# Patient Record
Sex: MALE | Race: WHITE | NOT HISPANIC OR LATINO | URBAN - METROPOLITAN AREA
[De-identification: names, ages, dates, MRNs, and addresses within clinical notes are randomized per-mention and may not be internally consistent; named-entity substitution may affect disease eponyms.]

---

## 2019-04-03 ENCOUNTER — APPOINTMENT (OUTPATIENT)
Dept: GENERAL RADIOLOGY | Facility: HOSPITAL | Age: 54
End: 2019-04-03

## 2019-04-03 ENCOUNTER — HOSPITAL ENCOUNTER (EMERGENCY)
Facility: HOSPITAL | Age: 54
Discharge: HOME | End: 2019-04-03
Attending: EMERGENCY MEDICINE | Admitting: EMERGENCY MEDICINE

## 2019-04-03 VITALS
DIASTOLIC BLOOD PRESSURE: 87 MMHG | TEMPERATURE: 98.1 F | OXYGEN SATURATION: 97 % | RESPIRATION RATE: 24 BRPM | SYSTOLIC BLOOD PRESSURE: 150 MMHG | BODY MASS INDEX: 26.68 KG/M2 | WEIGHT: 170 LBS | HEIGHT: 67 IN | HEART RATE: 96 BPM

## 2019-04-03 DIAGNOSIS — E87.20 COMPENSATED METABOLIC ACIDOSIS: Primary | ICD-10-CM

## 2019-04-03 DIAGNOSIS — N28.9 RENAL INSUFFICIENCY: ICD-10-CM

## 2019-04-03 LAB
ALBUMIN SERPL-MCNC: 4.55 G/DL (ref 3.2–4.8)
ALBUMIN/GLOB SERPL: 2.1 G/DL (ref 1.5–2.5)
ALP SERPL-CCNC: 60 U/L (ref 25–100)
ALT SERPL W P-5'-P-CCNC: 156 U/L (ref 7–40)
ANION GAP SERPL CALCULATED.3IONS-SCNC: 13 MMOL/L (ref 3–11)
ARTERIAL PATENCY WRIST A: ABNORMAL
AST SERPL-CCNC: 80 U/L (ref 0–33)
ATMOSPHERIC PRESS: ABNORMAL MMHG
BACTERIA UR QL AUTO: NORMAL /HPF
BASE EXCESS BLDA CALC-SCNC: -6.6 MMOL/L (ref 0–2)
BASOPHILS # BLD AUTO: 0.04 10*3/MM3 (ref 0–0.2)
BASOPHILS NFR BLD AUTO: 0.5 % (ref 0–1)
BDY SITE: ABNORMAL
BILIRUB SERPL-MCNC: 0.5 MG/DL (ref 0.3–1.2)
BILIRUB UR QL STRIP: NEGATIVE
BODY TEMPERATURE: 37 C
BUN BLD-MCNC: 18 MG/DL (ref 9–23)
BUN/CREAT SERPL: 9.9 (ref 7–25)
CALCIUM SPEC-SCNC: 9.4 MG/DL (ref 8.7–10.4)
CHLORIDE SERPL-SCNC: 111 MMOL/L (ref 99–109)
CLARITY UR: CLEAR
CO2 BLDA-SCNC: 17.5 MMOL/L (ref 23–27)
CO2 SERPL-SCNC: 14 MMOL/L (ref 20–31)
COHGB MFR BLD: 1.7 % (ref 0–2)
COLOR UR: YELLOW
CREAT BLD-MCNC: 1.82 MG/DL (ref 0.6–1.3)
DEPRECATED RDW RBC AUTO: 46 FL (ref 37–54)
EOSINOPHIL # BLD AUTO: 0.09 10*3/MM3 (ref 0–0.3)
EOSINOPHIL NFR BLD AUTO: 1.1 % (ref 0–3)
ERYTHROCYTE [DISTWIDTH] IN BLOOD BY AUTOMATED COUNT: 13.1 % (ref 11.3–14.5)
GFR SERPL CREATININE-BSD FRML MDRD: 39 ML/MIN/1.73
GLOBULIN UR ELPH-MCNC: 2.2 GM/DL
GLUCOSE BLD-MCNC: 96 MG/DL (ref 70–100)
GLUCOSE BLDC GLUCOMTR-MCNC: 127 MG/DL (ref 70–130)
GLUCOSE UR STRIP-MCNC: NEGATIVE MG/DL
HCO3 BLDA-SCNC: 16.7 MMOL/L (ref 20–26)
HCT VFR BLD AUTO: 39.1 % (ref 38.9–50.9)
HCT VFR BLD CALC: 40 %
HGB BLD-MCNC: 13.3 G/DL (ref 13.1–17.5)
HGB BLDA-MCNC: 13 G/DL (ref 13.5–17.5)
HGB UR QL STRIP.AUTO: NEGATIVE
HOLD SPECIMEN: NORMAL
HOLD SPECIMEN: NORMAL
HOROWITZ INDEX BLD+IHG-RTO: 21 %
HYALINE CASTS UR QL AUTO: NORMAL /LPF
IMM GRANULOCYTES # BLD AUTO: 0.03 10*3/MM3 (ref 0–0.05)
IMM GRANULOCYTES NFR BLD AUTO: 0.4 % (ref 0–0.6)
KETONES UR QL STRIP: NEGATIVE
LEUKOCYTE ESTERASE UR QL STRIP.AUTO: ABNORMAL
LYMPHOCYTES # BLD AUTO: 3.64 10*3/MM3 (ref 0.6–4.8)
LYMPHOCYTES NFR BLD AUTO: 45.2 % (ref 24–44)
MAGNESIUM SERPL-MCNC: 1.8 MG/DL (ref 1.3–2.7)
MCH RBC QN AUTO: 32.8 PG (ref 27–31)
MCHC RBC AUTO-ENTMCNC: 34 G/DL (ref 32–36)
MCV RBC AUTO: 96.3 FL (ref 80–99)
METHGB BLD QL: 1.3 % (ref 0–1.5)
MODALITY: ABNORMAL
MONOCYTES # BLD AUTO: 1.37 10*3/MM3 (ref 0–1)
MONOCYTES NFR BLD AUTO: 17 % (ref 0–12)
NEUTROPHILS # BLD AUTO: 2.91 10*3/MM3 (ref 1.5–8.3)
NEUTROPHILS NFR BLD AUTO: 36.2 % (ref 41–71)
NITRITE UR QL STRIP: NEGATIVE
NOTE: ABNORMAL
OXYHGB MFR BLDV: 94.6 % (ref 94–99)
PCO2 BLDA: 26.6 MM HG
PCO2 TEMP ADJ BLD: 26.6 MM HG (ref 35–48)
PH BLDA: 7.41 PH UNITS (ref 7.35–7.45)
PH UR STRIP.AUTO: 6.5 [PH] (ref 5–8)
PH, TEMP CORRECTED: 7.41 PH UNITS
PLATELET # BLD AUTO: 293 10*3/MM3 (ref 150–450)
PMV BLD AUTO: 9.5 FL (ref 6–12)
PO2 BLDA: 88.3 MM HG (ref 83–108)
PO2 TEMP ADJ BLD: 88.3 MM HG (ref 83–108)
POTASSIUM BLD-SCNC: 3.2 MMOL/L (ref 3.5–5.5)
PROT SERPL-MCNC: 6.7 G/DL (ref 5.7–8.2)
PROT UR QL STRIP: NEGATIVE
RBC # BLD AUTO: 4.06 10*6/MM3 (ref 4.2–5.76)
RBC # UR: NORMAL /HPF
REF LAB TEST METHOD: NORMAL
SODIUM BLD-SCNC: 138 MMOL/L (ref 132–146)
SP GR UR STRIP: 1.01 (ref 1–1.03)
SQUAMOUS #/AREA URNS HPF: NORMAL /HPF
TROPONIN I SERPL-MCNC: 0.02 NG/ML (ref 0–0.07)
UROBILINOGEN UR QL STRIP: ABNORMAL
VENTILATOR MODE: ABNORMAL
WBC NRBC COR # BLD: 8.05 10*3/MM3 (ref 3.5–10.8)
WBC UR QL AUTO: NORMAL /HPF
WHOLE BLOOD HOLD SPECIMEN: NORMAL
WHOLE BLOOD HOLD SPECIMEN: NORMAL

## 2019-04-03 PROCEDURE — 82805 BLOOD GASES W/O2 SATURATION: CPT

## 2019-04-03 PROCEDURE — 81001 URINALYSIS AUTO W/SCOPE: CPT | Performed by: EMERGENCY MEDICINE

## 2019-04-03 PROCEDURE — 83735 ASSAY OF MAGNESIUM: CPT | Performed by: EMERGENCY MEDICINE

## 2019-04-03 PROCEDURE — 71045 X-RAY EXAM CHEST 1 VIEW: CPT

## 2019-04-03 PROCEDURE — 36600 WITHDRAWAL OF ARTERIAL BLOOD: CPT

## 2019-04-03 PROCEDURE — 93005 ELECTROCARDIOGRAM TRACING: CPT | Performed by: EMERGENCY MEDICINE

## 2019-04-03 PROCEDURE — 80053 COMPREHEN METABOLIC PANEL: CPT | Performed by: EMERGENCY MEDICINE

## 2019-04-03 PROCEDURE — 99285 EMERGENCY DEPT VISIT HI MDM: CPT

## 2019-04-03 PROCEDURE — 82962 GLUCOSE BLOOD TEST: CPT

## 2019-04-03 PROCEDURE — 93005 ELECTROCARDIOGRAM TRACING: CPT

## 2019-04-03 PROCEDURE — 85025 COMPLETE CBC W/AUTO DIFF WBC: CPT

## 2019-04-03 PROCEDURE — 84484 ASSAY OF TROPONIN QUANT: CPT

## 2019-04-03 RX ORDER — LISINOPRIL 40 MG/1
40 TABLET ORAL DAILY
COMMUNITY
End: 2019-04-03

## 2019-04-03 RX ORDER — AMLODIPINE BESYLATE 10 MG/1
10 TABLET ORAL DAILY
Qty: 30 TABLET | Refills: 0 | Status: SHIPPED | OUTPATIENT
Start: 2019-04-03

## 2019-04-03 RX ORDER — NICOTINE 21 MG/24HR
1 PATCH, TRANSDERMAL 24 HOURS TRANSDERMAL
Status: DISCONTINUED | OUTPATIENT
Start: 2019-04-03 | End: 2019-04-04 | Stop reason: HOSPADM

## 2019-04-03 RX ORDER — HYDROCHLOROTHIAZIDE 12.5 MG/1
12.5 TABLET ORAL DAILY
COMMUNITY
End: 2019-04-03

## 2019-04-03 RX ORDER — SODIUM CHLORIDE 0.9 % (FLUSH) 0.9 %
10 SYRINGE (ML) INJECTION AS NEEDED
Status: DISCONTINUED | OUTPATIENT
Start: 2019-04-03 | End: 2019-04-04 | Stop reason: HOSPADM

## 2019-04-03 RX ADMIN — SODIUM CHLORIDE 1000 ML: 9 INJECTION, SOLUTION INTRAVENOUS at 20:18

## 2019-04-03 RX ADMIN — NICOTINE 1 PATCH: 21 PATCH, EXTENDED RELEASE TRANSDERMAL at 20:20

## 2019-04-03 NOTE — ED PROVIDER NOTES
Subjective   Numb and tingling for a couple months in hand; off and on; but now pretty constant  No pain; no breathing problems  Hands working normally; not dropping things   Htn lisinopril 40 mg   No cp   Smoker   No hld, diabetes, no hx of heart disease or stroke in family   Today on couch and neck was hurting; and he couldn't hardly walk or speak; problems with both sides  Cough spitting up green sputum; itchy eyes and nose and sneezing   No rhinorrhea   Good appetite, no urinary problems   3 redbulls today; drinks them everyday  No stimulants  Decongestants; allegra and delsium dm   No stress   Dizziness   No hands cramping up     Normal throat   No adenopathy or thyromegaly  No brewies/breweries; breuries   Rapid rate; regular rhythm   Lungs clear   No pitting edema   Neuro exam   Very rapid speech, appears anxious   slight hand tremor in neuro                 Review of Systems    No past medical history on file.    No Known Allergies    No past surgical history on file.    No family history on file.    Social History     Socioeconomic History   • Marital status:      Spouse name: Not on file   • Number of children: Not on file   • Years of education: Not on file   • Highest education level: Not on file         Objective   Physical Exam    Procedures         ED Course                     MDM    Final diagnoses:   None       Documentation assistance provided by heraclio Chow.  Information recorded by the scribe was done at my direction and has been verified and validated by me.

## 2019-04-04 NOTE — DISCHARGE INSTRUCTIONS
Do not take ibuprofen or any other anti-inflammatories.    Taper off your Red Bulls and try to minimize stimulant intake in its place.    Stop the lisinopril and the hydrochlorothiazide in order to help your kidney function.    Return to the ER if you have a worsening illness.